# Patient Record
Sex: FEMALE | Race: WHITE | Employment: OTHER | URBAN - METROPOLITAN AREA
[De-identification: names, ages, dates, MRNs, and addresses within clinical notes are randomized per-mention and may not be internally consistent; named-entity substitution may affect disease eponyms.]

---

## 2020-02-17 ENCOUNTER — HOSPITAL ENCOUNTER (OUTPATIENT)
Dept: PHYSICAL THERAPY | Age: 75
Discharge: HOME | End: 2020-02-17
Payer: COMMERCIAL

## 2020-02-17 PROCEDURE — 97162 PT EVAL MOD COMPLEX 30 MIN: CPT

## 2020-02-17 PROCEDURE — 97112 NEUROMUSCULAR REEDUCATION: CPT

## 2020-02-17 NOTE — PROGRESS NOTES
PHYSICAL THERAPY - DAILY TREATMENT NOTE    Patient Name: Arya Torres        Date: 2020  : 1945   Yes Patient  Verified  Visit #:   1   of   4-6  Insurance: Payor: BLUE CROSS / Plan: [facility] [facility] / Product Type: PPO /      In time: 10:18 Out time: 10:51   Total Treatment Time: 33     Medicare/BCBS Time Tracking (below)   Total Timed Codes (min): 8 1:1 Treatment Time:  8     TREATMENT AREA =  Imbalance [R26.89]    SUBJECTIVE  Pain Level (on 0 to 10 scale):  0  / 10   Medication Changes/New allergies or changes in medical history, any new surgeries or procedures?    no  If yes, update Summary List   Subjective Functional Status/Changes:  []  No changes reported     See POC         OBJECTIVE      8 min Neuro Re-education:  [x]  See flow sheet   Rationale:      increase ROM and increase strength to improve the patients ability to safely perform ADLs     Billed With/As:   [x] TE   [] TA   [] Neuro   [] Self Care Patient Education: [x] Review HEP    [x] Progressed/Changed HEP based on:   [x] positioning   [x] body mechanics   [] transfers   [] heat/ice application    [] other:      Other Objective/Functional Measures:    See POC     Post Treatment Pain Level (on 0 to 10) scale:   0  / 10     ASSESSMENT  Assessment/Changes in Function:     See POC     []  See Progress Note/Recertification   Patient will continue to benefit from skilled PT services to modify and progress therapeutic interventions, address functional mobility deficits, address ROM deficits, address strength deficits, analyze and address soft tissue restrictions, analyze and cue movement patterns, analyze and modify body mechanics/ergonomics, assess and modify postural abnormalities and instruct in home and community integration to attain remaining goals.    Progress toward goals / Updated goals:    See newly established goals in POC     PLAN  [x]  Upgrade activities as tolerated yes Continue plan of care   []  Discharge due to : []  Other:      Therapist: Halle Barnes    Date: 2/17/2020 Time: 10:05 AM     Future Appointments   Date Time Provider Adrian Seo   3/4/2020 11:00 AM [facility]   3/9/2020  2:30 PM [facility]   3/16/2020  2:00 PM [facility]   3/23/2020  2:30 PM [facility]

## 2020-02-17 NOTE — PROGRESS NOTES
[facility] PHYSICAL THERAPY  [address] [place] [address] [address],[place], [facility] - Phone: [phone]  Fax: [phone] / [address] [address] PHYSICAL THERAPY SERVICES  Patient Name: Naheed Harman : 1945   Medical   Diagnosis: Imbalance [R26.89] Treatment Diagnosis: Imbalance [R26.89]   Onset Date: 3 years ago     Referral Source: Guadlupe Boxer, NP Start of Care [facility]): 2020   Prior Hospitalization: See medical history Provider #: [id number]   Prior Level of Function: H/O Dizziness with transferring from sit to stand and imbalance with ambulation   Comorbidities: Arthritis, CVD, Depression, and HTN   Medications: Verified on Patient Summary List   The Plan of Care and following information is based on the information from the initial evaluation.   ===========================================================================================  Assessment / key information: Patient is 66 y.o. female who [facility] to [facility], [facility] with diagnosis of Imbalance [R26.89]. Patient reports imbalance and impaired gait began 3-4 years ago with episode of vertigo. Since initial episode of dizziness 3-4 years ago patient denies imbalance or dizziness. Chief complaint is constant tinnitus. Upon objective evaluation, patient demonstrates impaired use of vestibular input, decreased use hip and ankle balance strategies, path deviations anddecreased safety with gait, and impaired static and dynamic standing balance. Patient scored 19/30 on FGA indicating increased risk of falls with ambulation.  Patient can benefit from PT interventions to decrease r/o fall, improve safety with ADLs, & decrease sx with ADLs & to improve overall functional status.  ===========================================================================================  Eval Complexity: History MEDIUM  Complexity : 1-2 comorbidities / personal factors will impact the outcome/ POC ;  Examination  HIGH Complexity : 4+ Standardized tests and measures addressing body structure, function, activity limitation and / or participation in recreation ; Presentation LOW Complexity : Stable, uncomplicated ;  Decision Making Other outcome measures Objective Measurements   MEDIUM; Overall Complexity LOW   Problem List: decrease ROM, decrease strength, impaired gait/ balance, decrease ADL/ functional abilitiies, decrease activity tolerance, decrease flexibility/ joint mobility, decrease transfer abilities and other dizziness affecting functional mobility    Treatment Plan may include any combination of the following: Therapeutic exercise, Therapeutic activities, Neuromuscular re-education, Physical agent/modality, Gait/balance training, Manual therapy, Patient education, Self Care training, Functional mobility training, Home safety training and Stair training  Patient / Family readiness to learn indicated by: asking questions, trying to perform skills and interest  Persons(s) to be included in education: patient (P)  Barriers to Learning/Limitations: None  Measures taken, if barriers to learning:    Patient Goal (s): \"Less ringing in the ears\"   Patient self reported health status: good  Rehabilitation Potential: good   Short Term Goals: To be accomplished in  2  weeks:  1) Patient performing daily HEP and educated in fall prevention techniques.  Long Term Goals: To be accomplished in  6  weeks:  1) Patient to be independent with HEP and instructed in vestibular taper to ensure safety and decreased risk of falls following discharge. 2) Patient to improve score on FGA to 22/30 indicating decreased fall risk with ambulation. 3) Patient will be able to maintain MSR heel to GT for 30 seconds eyes closed to increase safety with showering.        Frequency / Duration:   Patient to be seen  1-2  times per week for 6  weeks:  Patient / Caregiver education and instruction: self care, activity modification and exercises  Therapist Signature: Vin Pierce Date: 2/34/2117   Certification Period: 2/17/2020 to 5/15/2019 Time: 10:20 AM   ===========================================================================================  I certify that the above Physical Therapy Services are being furnished while the patient is under my care. I agree with the treatment plan and certify that this therapy is necessary. Physician Signature:        Date:       Time:     Please sign and return to [facility] at [facility], [facility]. or you may fax the signed copy to [phone]. Thank you.

## 2020-03-04 ENCOUNTER — HOSPITAL ENCOUNTER (OUTPATIENT)
Dept: PHYSICAL THERAPY | Age: 75
Discharge: HOME | End: 2020-03-04
Payer: MEDICARE

## 2020-03-04 PROCEDURE — 97116 GAIT TRAINING THERAPY: CPT

## 2020-03-04 PROCEDURE — 97112 NEUROMUSCULAR REEDUCATION: CPT

## 2020-03-04 NOTE — PROGRESS NOTES
PHYSICAL THERAPY - DAILY TREATMENT NOTE    Patient Name: Lulú Neumann        Date: 3/4/2020  : 1945   yes Patient  Verified  Visit #:   2   of   4-6  Insurance: Payor: BLUE CROSS / Plan: [employer] [facility] / Product Type: PPO /      In time: 11:09 Out time: 11:36   Total Treatment Time: 27     Medicare/[employer] Time Tracking (below)   Total Timed Codes (min):  27 1:1 Treatment Time:  27     TREATMENT AREA =  Imbalance [R26.89]    SUBJECTIVE  Pain Level (on 0 to 10 scale):  0  / 10   Medication Changes/New allergies or changes in medical history, any new surgeries or procedures?    no  If yes, update Summary List   Subjective Functional Status/Changes:  []  No changes reported     Homework been ok, its a little better ringing, comes and goes           OBJECTIVE  Modalities Rationale:   N/A    19 min Neuro Re-education: [x]  See flow sheet   Rationale:   Improve standing proprioception to improve patients ability to balance in the shower and stand at night    8 min Gait Training:  ___ feet with ___ device on level surfaces with ___ level of assistance   Rationale: To improve ambulation safety and efficiency in order to improve patient's ability to safely ambulate at home for self care.     Billed With/As:   [] TE   [] TA   [x] Neuro   [] Self Care Patient Education: [x] Review HEP    [] Progressed/Changed HEP based on:   [] positioning   [x] body mechanics   [x] transfers   [] heat/ice application    [x] other: Gait and static standing balance     Other Objective/Functional Measures:    Cuing for smooth head turns with VSE, education to not pause in the middle of the had turn  Demo decr candence, NUZHAT, and step length with retro walking with VHTs  Incr path dev to the right with amb with EC, requires CGA   Demonstrates leg hooking compensatory motion with SLS     Post Treatment Pain Level (on 0 to 10) scale:   0  / 10     ASSESSMENT  Assessment/Changes in Function:     Good tolerance for progressions in static standing balance as patient performed balance training exercises safely without LOB. Exhibited slight path deviations with gait training and sway/ankle strategy with balance training and need for assist/skilled PT. Required SBA/CGA for static standing balance EC on foam and gait training with EC.      []  See Progress Note/Recertification   Patient will continue to benefit from skilled PT services to modify and progress therapeutic interventions, address functional mobility deficits, address ROM deficits, address strength deficits, analyze and cue movement patterns, analyze and modify body mechanics/ergonomics, assess and modify postural abnormalities, address imbalance/dizziness and instruct in home and community integration to attain remaining goals. Progress toward goals / Updated goals:    First visit after initial evaluation. Progress tx per POC.         PLAN  [x]  Upgrade activities as tolerated yes Continue plan of care   []  Discharge due to :    []  Other:      Therapist: Yanique Boateng    Date: 3/4/2020 Time: 8:09 AM     Future Appointments   Date Time Provider Adrian Seo   3/4/2020 11:00 AM [facility]   3/9/2020  2:30 PM [facility]   3/16/2020  2:00 PM [facility]   3/23/2020  2:30 PM [facility]

## 2020-03-09 ENCOUNTER — HOSPITAL ENCOUNTER (OUTPATIENT)
Dept: PHYSICAL THERAPY | Age: 75
Discharge: HOME | End: 2020-03-09
Payer: MEDICARE

## 2020-03-09 PROCEDURE — 97112 NEUROMUSCULAR REEDUCATION: CPT

## 2020-03-09 PROCEDURE — 97116 GAIT TRAINING THERAPY: CPT

## 2020-03-09 NOTE — PROGRESS NOTES
PHYSICAL THERAPY - DAILY TREATMENT NOTE    Patient Name: Terry Johnson        Date: 3/9/2020  : 1945   yes Patient  Verified  Visit #:   3  of   4-6  Insurance: Payor: [place] MED[employer] / Plan: [place] MEDICARE PART A & B / Product Type: Medicare /      In time: 2:36 Out time: 3:04   Total Treatment Time: 28     Medicare/BCBS Time Tracking (below)   Total Timed Codes (min):  28 1:1 Treatment Time: 28     TREATMENT AREA =  Imbalance [R26.89]    SUBJECTIVE  Pain Level (on 0 to 10 scale):  0  / 10   Medication Changes/New allergies or changes in medical history, any new surgeries or procedures?    no  If yes, update Summary List   Subjective Functional Status/Changes:  []  No changes reported     Ringning is much much better, when it starts ringing I do my exercises and it helps          OBJECTIVE  Modalities Rationale:   N/A    20 min Neuro Re-education: [x]  See flow sheet   Rationale:   Improve standing proprioception to improve patients ability to balance in the shower and stand at night    8 min Gait Training:  _20-30__ feet without assistive device on level surfaces with ___ level of assistance   Rationale: To improve ambulation safety and efficiency in order to improve patient's ability to safely ambulate at home for self care. Billed With/As:   [] TE   [] TA   [x] Neuro   [] Self Care Patient Education: [x] Review HEP    [] Progressed/Changed HEP based on:   [] positioning   [x] body mechanics   [x] transfers   [] heat/ice application    [x] other: Gait and static standing balance     Other Objective/Functional Measures:  Fair tolerance for Nustep warm up secondary to increased R Knee sx. Improved form with VSE exercise  Incr path dev and use of UEs with amb with EC and retro with VHTs     Post Treatment Pain Level (on 0 to 10) scale:   0  / 10     ASSESSMENT  Assessment/Changes in Function:     Continues to demonstrate veering and path deviations with gait training with head turns and EC.  Improving static standing balance EC on foam. Good  Response to treatment and denies ncr sx post session. []  See Progress Note/Recertification   Patient will continue to benefit from skilled PT services to modify and progress therapeutic interventions, address functional mobility deficits, address ROM deficits, address strength deficits, analyze and cue movement patterns, analyze and modify body mechanics/ergonomics, assess and modify postural abnormalities, address imbalance/dizziness and instruct in home and community integration to attain remaining goals. Progress toward goals / Updated goals:    Progressing towards STG 1.   1) Patient performing daily HEP and educated in fall prevention techniques.         PLAN  [x]  Upgrade activities as tolerated yes Continue plan of care   []  Discharge due to :    []  Other:      Therapist: Virginia White    Date: 3/9/2020 Time: 8:09 AM     Future Appointments   Date Time Provider Adrian Seo   3/9/2020  2:30 PM [facility]   3/16/2020  2:00 PM [facility]   3/23/2020  2:30 PM [facility]

## 2020-03-16 ENCOUNTER — APPOINTMENT (OUTPATIENT)
Dept: PHYSICAL THERAPY | Age: 75
End: 2020-03-16
Payer: MEDICARE

## 2022-08-22 ENCOUNTER — HOSPITAL ENCOUNTER (OUTPATIENT)
Dept: PHYSICAL THERAPY | Age: 77
Discharge: HOME | End: 2022-08-22
Payer: MEDICARE

## 2022-08-22 PROCEDURE — 97161 PT EVAL LOW COMPLEX 20 MIN: CPT

## 2022-08-22 PROCEDURE — 97110 THERAPEUTIC EXERCISES: CPT

## 2022-08-22 PROCEDURE — 97535 SELF CARE MNGMENT TRAINING: CPT

## 2022-08-22 NOTE — PROGRESS NOTES
[address]  [address] [place] [address] Drive, [facility] - Phone: [phone]  Fax: [phone] / [phone] [facility]  Patient Name: Sandro Modi : 1945   Medical   Diagnosis: Anterolisthesis of cervical spine  OA (B) knees   Backache  Treatment Diagnosis: Spondylolisthesis, cervical region [M43.12]  Dorsalgia, unspecified [M54.9]  Pain in unspecified knee [M25.569]   Onset Date: ~     Referral Source: Angelina Valdes MD Start of [facility]): 2022   Prior Hospitalization: See medical history Provider #: [id number]   Prior Level of Function: Chronic pain with ambulation, stairs, transfers and ADL's    Comorbidities: Arthritis, Vertigo,    Medications: Verified on Patient Summary List   The Plan of Care and following information is based on the information from the initial evaluation.   ===========================================================================================  Assessment / key information:  Patient is a 64year old female presenting to PT with cc of B knee pain and neck pain (R>L). Pt has long standing history of arthritis in \"a lot of joints\". Pt admits to not wanting to get TKR on either knee and wishes to address conservatively. R knee is more bothersome than the L. Currently wearing compression knee brace on R knee, wishes to get one for L knee. Pt does not have stairs in her home, uses hands to get up from a chair. Pt keeps SPC in the care, however does not wish to use it yet. Denies falls, admits to occasional stumbling. Admits to sharp pain on the R side of neck, will decrease when she puts pressure on it. Most aggravating with laying on R and turning head.  Pain ranges from 5-7/10 at worst, 5/10 at best. Easing factors: icy hot (knees), pain relief ointment on neck (pt cannot recall name)  Pt has had PT previously, which she reports was successful and wishes to start again to reduce pain and strengthen the knees. Objective Data:   FOTO score = 38 (an established functional score where 100 = no disability). Posture: slight FHRS positioning   Gait: decreased meagan, moderate Trendelenburg  Cervical ROM: Flex WNL, Ext 25% limited, Rot L= 50 deg, R= 55 deg  Knee ROM: R knee (in brace) 0-127 deg, L knee 0-138 deg pain   Flexibility: hamstrings and gastroc mildly impaired B  Strength MMT: Knee= Flex: 4/5 R, 4+/5 L. Ext: 4+/5 B. . Hip Abduction= 3/5 B. Quad lag noted B during SLR flexion   STS: use of B UE to stand, increased use of momentum   Palpation: TTP and restriction noted to B quads, hamstrings and calf complex. R UT, LS, Rhomboids with HA reproduction during palpation    HEP: Access Code: [email or web address]  URL: [email or web address]  Date: 06/03/2022 Prepared by: Samantha Simms, PT     Exercises  Upper trap stretch - 1 x daily - 7 x weekly - 1 sets - 3 reps - 20s hold  Levator scapulae stretch - 1 x daily - 7 x weekly - 1 sets - 3 reps - 20s hold  Supine quad set- 1 x daily - 7 x weekly - 2 sets - 10 reps - 3s hold  Seated LAQ- 1 x daily - 7 x weekly - 2 sets - 10 reps   Standing hip abduction - 1 x daily - 7 x weekly - 2 sets - 10 reps     Pt counseled on diagnosis, prognosis, PT findings and POC. Pt educated on HEP with review and demonstration with printed copy, advised pt to discontinue if symptoms worsen.  Pt will benefit from PT to address deficits listed in this document to facilitate improved ADL's and function.   ===========================================================================================  Eval Complexity: History MEDIUM  Complexity : 1-2 comorbidities / personal factors will impact the outcome/ POC ;  Examination  LOW Complexity : 1-2 Standardized tests and measures addressing body structure, function, activity limitation and / or participation in recreation ; Presentation LOW Complexity : Stable, uncomplicated ;  Decision Making MEDIUM Complexity : FOTO score of 26-74; Overall Complexity LOW   Problem List: pain affecting function, decrease ROM, decrease strength, impaired gait/ balance, decrease ADL/ functional abilitiies, decrease activity tolerance, decrease flexibility/ joint mobility, and decrease transfer abilities   Treatment Plan may include any combination of the following: Therapeutic exercise, Therapeutic activities, Neuromuscular re-education, Physical agent/modality, Gait/balance training, Manual therapy, Aquatic therapy, Patient education, Self Care training, Functional mobility training, and Stair training  Patient / Family readiness to learn indicated by: asking questions and interest  Persons(s) to be included in education: patient (P)  Barriers to Learning/Limitations: None  Measures taken, if barriers to learning:    Patient Goal (s): \" Some movement relief\"    Patient self reported health status: good  Rehabilitation Potential: good  Short Term Goals: To be accomplished in  2  weeks:  Patient will be independent with HEP to improve self management of symptoms   Patient will report 4/10 max pain during ADL's   Patient will improve cervical rotation to >/= 60 deg B   Long Term Goals:  To be accomplished in  6  weeks:  Patient will improve FOTO score by >/= 10 points for improved overall function   Patient will demonstrate ability to perform 5x STS from chair without use of UE for improved functional strength   Patient will improve B hip abduction strength to </= 4/5 for improved gait, balance and transfers   Frequency / Duration:   Patient to be seen  2  times per week for 6-8  weeks:  Patient / Caregiver education and instruction: self care, activity modification, and exercises  Therapist Signature: Jana Hernandez PT Date: 0/37/6013   Certification Period: 8/22/22- 11/22/22 Time: 8:09 AM   ===========================================================================================  I certify that the above Physical Therapy Services are being furnished while the patient is under my care. I agree with the treatment plan and certify that this therapy is necessary. Physician Signature:        Date:       Time:                                        Alma Patel MD  Please sign and return to [facility] at [facility], [facility]. or you may fax the signed copy to [phone]. Thank you.

## 2022-08-22 NOTE — PROGRESS NOTES
PHYSICAL THERAPY - DAILY TREATMENT NOTE    Patient Name: Saida Bateman        Date: 2022  : 1945   yes Patient  Verified  Visit #:   1     Insurance: Payor: Sheela Cordero / Plan: [place] MEDICARE PART A & B / Product Type: Medicare /      In time: 9613 Out time: 46   Total Treatment Time: 45     Medicare/BCBS Time Tracking (below)   Total Timed Codes (min):  25 1:1 Treatment Time:  45     TREATMENT AREA =  Spondylolisthesis, cervical region [M43.12]  Dorsalgia, unspecified [M54.9]  Pain in unspecified knee [M25.569]    SUBJECTIVE  Pain Level (on 0 to 10 scale):  0  / 10   Medication Changes/New allergies or changes in medical history, any new surgeries or procedures?    no  If yes, update Summary List   Subjective Functional Status/Changes:  []  No changes reported     See POC          OBJECTIVE    15 min Therapeutic Exercise:  [x]  See flow sheet   Rationale:      increase ROM, increase strength, and improve coordination to improve the patients ability to perform ADL's and functional tasks      10 min Self Care: Pt counseled on PT findings, expectations, prognosis and POC. Pt educated on HEP with review and demonstration. Advised pt to utilize second knee brace on L if needed for symptom relief. Rationale:     Patient education  to improve the patients ability to self manage symptoms.      Billed With/As:   [x] TE   [] TA   [] Neuro   [] Self Care Patient Education: [x] Review HEP    [] Progressed/Changed HEP based on:   [] positioning   [] body mechanics   [] transfers   [] heat/ice application    [] other:      Other Objective/Functional Measures:    See POC   Post Treatment Pain Level (on 0 to 10) scale:   0  / 10     ASSESSMENT  Assessment/Changes in Function:     See POC     []  See Progress Note/Recertification   Patient will continue to benefit from skilled PT services to modify and progress therapeutic interventions, address functional mobility deficits, address ROM deficits, address strength deficits, analyze and address soft tissue restrictions, analyze and cue movement patterns, analyze and modify body mechanics/ergonomics, and assess and modify postural abnormalities to attain remaining goals.    Progress toward goals / Updated goals:    See POC     PLAN  [x]  Upgrade activities as tolerated yes Continue plan of care   []  Discharge due to :    []  Other:      Therapist: Francheska Herron PT    Date: 8/22/2022 Time: 8:12 AM     Future Appointments   Date Time Provider Adrian Seo   8/22/2022 11:00 AM Misa Gresham, [address] [facility]

## 2022-08-25 ENCOUNTER — HOSPITAL ENCOUNTER (OUTPATIENT)
Dept: PHYSICAL THERAPY | Age: 77
Discharge: HOME | End: 2022-08-25
Payer: MEDICARE

## 2022-08-25 PROCEDURE — 97140 MANUAL THERAPY 1/> REGIONS: CPT

## 2022-08-25 PROCEDURE — 97530 THERAPEUTIC ACTIVITIES: CPT

## 2022-08-25 PROCEDURE — 97110 THERAPEUTIC EXERCISES: CPT

## 2022-08-25 NOTE — PROGRESS NOTES
PHYSICAL THERAPY - DAILY TREATMENT NOTE    Patient Name: Salvador To        Date: 2022  : 1945   yes Patient  Verified  Visit #:   2   of     Insurance: Payor: Mishel Roland / Plan: [place] / Product Type: Medicare /      In time: 215 Out time: 305   Total Treatment Time: 50     Medicare/BCBS Time Tracking (below)   Total Timed Codes (min):  40 1:1 Treatment Time:  50     TREATMENT AREA =  Spondylolisthesis, cervical region [M43.12]  Dorsalgia, unspecified [M54.9]  Pain in unspecified knee [M25.569]    SUBJECTIVE  Pain Level (on 0 to 10 scale):  8  / 10 (knees) 7/10 neck   Medication Changes/New allergies or changes in medical history, any new surgeries or procedures?    no  If yes, update Summary List   Subjective Functional Status/Changes:  []  No changes reported     Pt states she has been complaint with HEP most days, can tell it is helping.            OBJECTIVE  Modalities Rationale:     decrease inflammation and decrease pain to improve patient's ability to tolerate ADL's and standing tasks    min [] Estim, type/location:                                      []  att     []  unatt     []  w/US     []  w/ice    []  w/heat    min []  Mechanical Traction: type/lbs                   []  pro   []  sup   []  int   []  cont    []  before manual    []  after manual    min []  Ultrasound, settings/location:      min []  Iontophoresis w/ dexamethasone, location:                                               []  take home patch       []  in clinic   10 min [x]  Ice     []  Heat    location/position: B knees in long sit position, knees propped     min []  Vasopneumatic Device, press/temp:    If using vaso (only need to measure limb vaso being performed on)      pre-treatment girth :       post-treatment girth :       measured at (landmark location) :      min []  Other:    [x] Skin assessment post-treatment (if applicable):    [x]  intact    []  redness- no adverse reaction []redness - adverse reaction:      17 min Therapeutic Exercise:  [x]  See flow sheet   Rationale:      increase ROM and increase strength to improve the patients ability to perform ADL's and standing tasks      15 min Manual Therapy: STM to B quads, hamstrings, UT, LS, cervical perispinal's   B patella mobilizations   Rationale:      decrease pain, increase ROM, and increase tissue extensibility to improve patient's ability to perform ADL's and functional tasks   The manual therapy interventions were performed at a separate and distinct time from the therapeutic activities interventions. 8 min Therapeutic Activity: [x]  See flow sheet   Rationale:    increase strength and improve coordinationto improve the patients ability to perform functional tasks and transfers     Billed With/As:   [x] TE   [] TA   [] Neuro   [] Self Care Patient Education: [x] Review HEP    [] Progressed/Changed HEP based on:   [] positioning   [] body mechanics   [] transfers   [] heat/ice application    [] other:      Other Objective/Functional Measures:    Initiated and progress therapeutic intervention today per flow sheet, tolerated well without adverse reactions     TTP to R UT and LS, reduced with manuals     Requires verbal and tactile cueing for STS without use of UE and lateral stepping in parallel bars to avoid ER. Post Treatment Pain Level (on 0 to 10) scale:   1  / 10 (L knee), 4/10 (R knee), 0/10 neck      ASSESSMENT  Assessment/Changes in Function:     Pt faired well during session today and thus far responding favorably to therapeutic intervention with decreased reports of pain at end of session. Pt most challenged with STS transfers today, utilized hi-low table for manageable height to perform without use of B UE. Will continue to progress as able.       []  See Progress Note/Recertification   Patient will continue to benefit from skilled PT services to modify and progress therapeutic interventions, address functional mobility deficits, address ROM deficits, address strength deficits, analyze and address soft tissue restrictions, analyze and cue movement patterns, analyze and modify body mechanics/ergonomics, and assess and modify postural abnormalities to attain remaining goals. Progress toward goals / Updated goals:    Initiated, first follow up session. All goals remain appropriate.       PLAN  [x]  Upgrade activities as tolerated yes Continue plan of care   []  Discharge due to :    []  Other:      Therapist: Santosh Cabrera PT    Date: 8/25/2022 Time: 1:45 PM     Future Appointments   Date Time Provider Adrian Seo   8/25/2022  2:00 PM Salvador Jimenes, [facility]   8/30/2022  2:45 PM Salvador Jimenes, [address] [facility]

## 2022-08-30 ENCOUNTER — HOSPITAL ENCOUNTER (OUTPATIENT)
Dept: PHYSICAL THERAPY | Age: 77
Discharge: HOME | End: 2022-08-30
Payer: MEDICARE

## 2022-08-30 PROCEDURE — 97140 MANUAL THERAPY 1/> REGIONS: CPT

## 2022-08-30 PROCEDURE — 97110 THERAPEUTIC EXERCISES: CPT

## 2022-08-30 NOTE — PROGRESS NOTES
PHYSICAL THERAPY - DAILY TREATMENT NOTE    Patient Name: Lauro Pickering        Date: 2022  : 1945   yes Patient  Verified  Visit #:   3   of   18  Insurance: Payor: Eva Roth / Plan: [place] MEDICARE PART A & B / Product Type: Medicare /      In time: 245 Out time: 335   Total Treatment Time: 50     Medicare/BCBS Time Tracking (below)   Total Timed Codes (min):  40 1:1 Treatment Time:  50     TREATMENT AREA =  Spondylolisthesis, cervical region [M43.12]  Dorsalgia, unspecified [M54.9]  Pain in unspecified knee [M25.569]    SUBJECTIVE  Pain Level (on 0 to 10 scale):  5 / 10 (R knee), 1/10 (L knee)   Medication Changes/New allergies or changes in medical history, any new surgeries or procedures?    no  If yes, update Summary List   Subjective Functional Status/Changes:  []  No changes reported     Pt states she doing well, can tell a huge improvement in her knees. States she has been doing her HEP without issues.           OBJECTIVE  Modalities Rationale:     decrease inflammation and decrease pain to improve patient's ability to perform standing and functional tasks    min [] Estim, type/location:                                      []  att     []  unatt     []  w/US     []  w/ice    []  w/heat    min []  Mechanical Traction: type/lbs                   []  pro   []  sup   []  int   []  cont    []  before manual    []  after manual    min []  Ultrasound, settings/location:      min []  Iontophoresis w/ dexamethasone, location:                                               []  take home patch       []  in clinic   10 min [x]  Ice     []  Heat    location/position: B knees in long sit position, knees propped     min []  Vasopneumatic Device, press/temp:    If using vaso (only need to measure limb vaso being performed on)      pre-treatment girth :       post-treatment girth :       measured at (landmark location) :      min []  Other:    [x] Skin assessment post-treatment (if applicable):    [x]  intact []  redness- no adverse reaction                  []redness - adverse reaction:      25 min Therapeutic Exercise:  [x]  See flow sheet   Rationale:      increase ROM and increase strength to improve the patients ability to perform standing tasks and ADL's      15 min Manual Therapy: STM to B quads, hamstrings, UT, LS, cervical perispinal's   B patella mobilizations   Rationale:      decrease pain, increase ROM, and increase tissue extensibility to improve patient's ability to tolerate ADL's and functional tasks   The manual therapy interventions were performed at a separate and distinct time from the therapeutic activities interventions. Billed With/As:   [x] TE   [] TA   [] Neuro   [] Self Care Patient Education: [x] Review HEP    [] Progressed/Changed HEP based on:   [] positioning   [] body mechanics   [] transfers   [] heat/ice application    [] other:      Other Objective/Functional Measures:    Decreased TTP to cervical musculature and knees today with manual intervention     Pt challenged with addition of 6\" step ups today, UE usage for stability and power up     Continued with exercises per flowsheet     Post Treatment Pain Level (on 0 to 10) scale:   2.5  / 10 (R knee), 0/10 L knee     ASSESSMENT  Assessment/Changes in Function:     Patient faired well during session today. Continues to respond favorably to manual and therapeutic intervention with decreased reports of pain levels after treatment. Patient counseled on benefits of continued therapy to improve ROM, strength and overall function. Will progress as able.       []  See Progress Note/Recertification   Patient will continue to benefit from skilled PT services to modify and progress therapeutic interventions, address functional mobility deficits, address ROM deficits, address strength deficits, analyze and address soft tissue restrictions, analyze and cue movement patterns, and analyze and modify body mechanics/ergonomics to attain remaining goals.   Progress toward goals / Updated goals:    Short Term Goals: To be accomplished in  2  weeks:  Patient will be independent with HEP to improve self management of symptoms - Progressing, pt reports compliance with initial HEP  Patient will report 4/10 max pain during ADL's   Patient will improve cervical rotation to >/= 60 deg B   Long Term Goals:  To be accomplished in  6  weeks:  Patient will improve FOTO score by >/= 10 points for improved overall function   Patient will demonstrate ability to perform 5x STS from chair without use of UE for improved functional strength   Patient will improve B hip abduction strength to </= 4/5 for improved gait, balance and transfers     PLAN  [x]  Upgrade activities as tolerated yes Continue plan of care   []  Discharge due to :    []  Other:      Therapist: Kath De Leon PT    Date: 8/30/2022 Time: 2:32 PM     Future Appointments   Date Time Provider Adrian Seo   8/30/2022  2:45 PM Penny Saucedo, [facility]   9/1/2022  2:00 PM Penny Saucedo [facility]   9/8/2022 12:00 PM Penny Saucedo [facility]   9/13/2022  2:00 PM Penny Saucedo [facility]

## 2022-09-01 ENCOUNTER — HOSPITAL ENCOUNTER (OUTPATIENT)
Dept: PHYSICAL THERAPY | Age: 77
Discharge: HOME | End: 2022-09-01
Payer: MEDICARE

## 2022-09-01 PROCEDURE — 97110 THERAPEUTIC EXERCISES: CPT

## 2022-09-01 PROCEDURE — 97140 MANUAL THERAPY 1/> REGIONS: CPT

## 2022-09-01 NOTE — PROGRESS NOTES
PHYSICAL THERAPY - DAILY TREATMENT NOTE    Patient Name: Cecily Shelby        Date: 2022  : 1945   yes Patient  Verified  Visit #:      18  Insurance: Payor: Manny Ruth / Plan: [place] MEDICARE PART A & B / Product Type: Medicare /      In time: 2:11 Out time: 2:58   Total Treatment Time: 47     Medicare/BCBS Time Tracking (below)   Total Timed Codes (min):  47 1:1 Treatment Time:  47     TREATMENT AREA =  Spondylolisthesis, cervical region [M43.12]  Dorsalgia, unspecified [M54.9]  Pain in unspecified knee [M25.569]    SUBJECTIVE  Pain Level (on 0 to 10 scale):  5 / 10 (R knee), 1/10 (L knee)   Medication Changes/New allergies or changes in medical history, any new surgeries or procedures?    no  If yes, update Summary List   Subjective Functional Status/Changes:  []  No changes reported     Patient reports falling the other day. \"I fell in my den, slowly, on to my hands and knees. I didn't hurt myself. I was able to get back up. \"         OBJECTIVE  Modalities Rationale:      N/A  to improve patient's ability to perform standing and functional tasks    min [] Estim, type/location:                                      []  att     []  unatt     []  w/US     []  w/ice    []  w/heat    min []  Mechanical Traction: type/lbs                   []  pro   []  sup   []  int   []  cont    []  before manual    []  after manual    min []  Ultrasound, settings/location:      min []  Iontophoresis w/ dexamethasone, location:                                               []  take home patch       []  in clinic   PD min [x]  Ice     []  Heat    location/position: B knees in long sit position, knees propped     min []  Vasopneumatic Device, press/temp:    If using vaso (only need to measure limb vaso being performed on)      pre-treatment girth :       post-treatment girth :       measured at (landmark location) :      min []  Other:    [x] Skin assessment post-treatment (if applicable):    [x]  intact    [] redness- no adverse reaction                  []redness - adverse reaction:      32 min Therapeutic Exercise:  [x]  See flow sheet   Rationale:      increase ROM and increase strength to improve the patients ability to perform standing tasks and ADL's      15 min Manual Therapy: STM to B quads, hamstrings,   B patella mobilizations; B manual hamstring stretch   Rationale:      decrease pain, increase ROM, and increase tissue extensibility to improve patient's ability to tolerate ADL's and functional tasks   The manual therapy interventions were performed at a separate and distinct time from the therapeutic activities interventions. Billed With/As:   [x] TE   [] TA   [] Neuro   [] Self Care Patient Education: [x] Review HEP    [] Progressed/Changed HEP based on:   [] positioning   [] body mechanics   [] transfers   [] heat/ice application    [] other:      Other Objective/Functional Measures:    *manual observation: increase tenderness and tightness along R knee medial joint line  *gradually progressed exercises by increasing repetitions (see flowsheet for loads, drills, & volumes)  *added incline board to increase LE flexibility       Post Treatment Pain Level (on 0 to 10) scale:   3  / 10 (R knee), 0/10 L knee     ASSESSMENT  Assessment/Changes in Function:     Patient demo good tolerance overall with PT interventions indcaited by reduction in pain and improved activity tolerance. Discussed fall prevention and fall recovery techniques to reduce knee exacerbation of symptoms. Discussed possibility of performing fall recovery techniques in clinic. Pt agreeable with this.      []  See Progress Note/Recertification   Patient will continue to benefit from skilled PT services to modify and progress therapeutic interventions, address functional mobility deficits, address ROM deficits, address strength deficits, analyze and address soft tissue restrictions, analyze and cue movement patterns, and analyze and modify body mechanics/ergonomics to attain remaining goals. Progress toward goals / Updated goals:    Short Term Goals: To be accomplished in  2  weeks:  Patient will be independent with HEP to improve self management of symptoms - Progressing, pt reports compliance with initial HEP  Patient will report 4/10 max pain during ADL's   Patient will improve cervical rotation to >/= 60 deg B   Long Term Goals:  To be accomplished in  6  weeks:  Patient will improve FOTO score by >/= 10 points for improved overall function   Patient will demonstrate ability to perform 5x STS from chair without use of UE for improved functional strength   Patient will improve B hip abduction strength to </= 4/5 for improved gait, balance and transfers       PLAN  [x]  Upgrade activities as tolerated yes Continue plan of care   []  Discharge due to :    []  Other:      Therapist: Anupam Lindsay    Date: 9/1/2022 Time: 6:57 PM     Future Appointments   Date Time Provider Adrian Seo   9/8/2022 12:00 PM Misa Gresham [facility]   9/13/2022  2:00 PM Misa Gresham [facility]   9/19/2022  8:30 AM Misa Gresham [facility]   9/26/2022  8:30 AM Misa rGesham PT [facility]   10/3/2022  9:40 AM Misa Gresham PT [facility]

## 2022-09-08 ENCOUNTER — APPOINTMENT (OUTPATIENT)
Dept: PHYSICAL THERAPY | Age: 77
End: 2022-09-08
Payer: MEDICARE

## 2022-09-13 ENCOUNTER — HOSPITAL ENCOUNTER (OUTPATIENT)
Dept: PHYSICAL THERAPY | Age: 77
Discharge: HOME | End: 2022-09-13
Payer: MEDICARE

## 2022-09-13 PROCEDURE — 97140 MANUAL THERAPY 1/> REGIONS: CPT

## 2022-09-13 PROCEDURE — 97530 THERAPEUTIC ACTIVITIES: CPT

## 2022-09-13 PROCEDURE — 97110 THERAPEUTIC EXERCISES: CPT

## 2022-09-13 NOTE — PROGRESS NOTES
PHYSICAL THERAPY - DAILY TREATMENT NOTE    Patient Name: Ro Nogueira        Date: 2022  : 1945   yes Patient  Verified  Visit #:      of   18  Insurance: Payor: Murphy Daniels / Plan: [place] MEDICARE PART A & B / Product Type: Medicare /      In time: 200 Out time: 255   Total Treatment Time: 55     Medicare/BCBS Time Tracking (below)   Total Timed Codes (min):  45 1:1 Treatment Time:  55     TREATMENT AREA =  Spondylolisthesis, cervical region [M43.12]  Dorsalgia, unspecified [M54.9]  Pain in unspecified knee [M25.569]    SUBJECTIVE  Pain Level (on 0 to 10 scale):  4  / 10 (both knees), 0/10 in neck with rest   Medication Changes/New allergies or changes in medical history, any new surgeries or procedures?    no  If yes, update Summary List   Subjective Functional Status/Changes:  []  No changes reported     Pt reports she is doing much better. Both knees are feeling better with less pain. Neck has been feeling better and only has caught once. States she does not need ice as much.          OBJECTIVE  Modalities Rationale:     decrease inflammation and decrease pain to improve patient's ability to perform ADL's and functional tasks    min [] Estim, type/location:                                      []  att     []  unatt     []  w/US     []  w/ice    []  w/heat    min []  Mechanical Traction: type/lbs                   []  pro   []  sup   []  int   []  cont    []  before manual    []  after manual    min []  Ultrasound, settings/location:      min []  Iontophoresis w/ dexamethasone, location:                                               []  take home patch       []  in clinic   10 min [x]  Ice     []  Heat    location/position: B knees in long sit position, knees propped     min []  Vasopneumatic Device, press/temp:    If using vaso (only need to measure limb vaso being performed on)      pre-treatment girth :       post-treatment girth :       measured at (landmark location) :      min []  Other: [x] Skin assessment post-treatment (if applicable):    [x]  intact    []  redness- no adverse reaction                  []redness - adverse reaction:      15 min Therapeutic Exercise:  [x]  See flow sheet   Rationale:      increase ROM and increase strength to improve the patients ability to perform ADL's and functional tasks      10 min Manual Therapy: STM to B quads, hamstrings,   B patella mobilizations; B manual hamstring stretch   Rationale:      decrease pain, increase ROM, and increase tissue extensibility to improve patient's ability to tolerate ADL's and functional tasks   The manual therapy interventions were performed at a separate and distinct time from the therapeutic activities interventions. 15 min Therapeutic Activity: [x]  See flow sheet   Rationale:    increase ROM, increase strength, and improve coordination to improve the patients ability to perform functional tasks and ease with transfers     Billed With/As:   [x] TE   [] TA   [] Neuro   [] Self Care Patient Education: [x] Review HEP    [] Progressed/Changed HEP based on:   [] positioning   [] body mechanics   [] transfers   [] heat/ice application    [] other:      Other Objective/Functional Measures:    Decreased tenderness noted throughout B knee joints today     Required VC and TC to avoid quad lag during SLR on L     Continued with therapeutic exercises per flow sheet, added supine bridges for posterior hip strength and stability      Post Treatment Pain Level (on 0 to 10) scale:   1  / 10 (B knees)     ASSESSMENT  Assessment/Changes in Function:     Pt faired well during session today with decreased reports of pain at end of treatment. Tolerated additional LE strengthening and progress resistance without adverse reactions.       []  See Progress Note/Recertification   Patient will continue to benefit from skilled PT services to modify and progress therapeutic interventions, address functional mobility deficits, address ROM deficits, address strength deficits, analyze and address soft tissue restrictions, analyze and cue movement patterns, analyze and modify body mechanics/ergonomics, assess and modify postural abnormalities, and instruct in home and community integration to attain remaining goals. Progress toward goals / Updated goals:    Short Term Goals: To be accomplished in  2  weeks:  Patient will be independent with HEP to improve self management of symptoms - Progressing, pt reports compliance with initial HEP  Patient will report 4/10 max pain during ADL's - Progressing / MET, pt reports max pain of 4/10. (9/13/22)  Patient will improve cervical rotation to >/= 60 deg B   Long Term Goals:  To be accomplished in  6  weeks:  Patient will improve FOTO score by >/= 10 points for improved overall function   Patient will demonstrate ability to perform 5x STS from chair without use of UE for improved functional strength   Patient will improve B hip abduction strength to </= 4/5 for improved gait, balance and transfers     PLAN  [x]  Upgrade activities as tolerated yes Continue plan of care   []  Discharge due to :    []  Other:      Therapist: Fredi Shabazz PT    Date: 9/13/2022 Time: 2:04 PM     Future Appointments   Date Time Provider Adrian Seo   9/19/2022  8:30 AM Jennifer Matthew, [place] [facility]   9/26/2022  8:30 AM Jennifer Matthew PT [facility]   10/3/2022  9:40 AM Jennifer Matthew, [address] [facility]

## 2022-09-19 ENCOUNTER — HOSPITAL ENCOUNTER (OUTPATIENT)
Dept: PHYSICAL THERAPY | Age: 77
Discharge: HOME | End: 2022-09-19
Payer: MEDICARE

## 2022-09-19 PROCEDURE — 97530 THERAPEUTIC ACTIVITIES: CPT

## 2022-09-19 PROCEDURE — 97110 THERAPEUTIC EXERCISES: CPT

## 2022-09-19 NOTE — PROGRESS NOTES
PHYSICAL THERAPY - DAILY TREATMENT NOTE    Patient Name: Cam Reveal        Date: 2022  : 1945   yes Patient  Verified  Visit #:      18  Insurance: Payor: Tom Yates / Plan: [place] MEDICARE PART A & B / Product Type: Medicare /      In time: 840 Out time: 918   Total Treatment Time: 38     Medicare/BCBS Time Tracking (below)   Total Timed Codes (min):  38 1:1 Treatment Time: 38     TREATMENT AREA =  Spondylolisthesis, cervical region [M43.12]  Dorsalgia, unspecified [M54.9]  Pain in unspecified knee [M25.569]    SUBJECTIVE  Pain Level (on 0 to 10 scale):  0  / 10 (L knee), 3/10 R knee   Medication Changes/New allergies or changes in medical history, any new surgeries or procedures?    no  If yes, update Summary List   Subjective Functional Status/Changes:  []  No changes reported     Pt reports knees are getting better and better everyday. This is the most motion she says she has ever had. PT is working for me. OBJECTIVE    13 min Therapeutic Exercise:  [x]  See flow sheet   Rationale:      increase ROM, increase strength, and improve coordination to improve the patients ability to perform ADL's and standing tasks      25 min Therapeutic Activity: [x]  See flow sheet   Rationale:    increase ROM, increase strength, improve coordination, and improve balance to improve the patients ability to perform functional tasks and ease with transfers     Billed With/As:   [] TE   [] TA   [] Neuro   [] Self Care Patient Education: [x] Review HEP    [] Progressed/Changed HEP based on:   [] positioning   [] body mechanics   [] transfers   [] heat/ice application    [] other:      Other Objective/Functional Measures: Added NuStep today for 5' and 4\" step ups without adverse reactions. Required B UE support for step ups.      Increased weight shift to L LE during sit to stands    Continued with therapeutic exercises per flow sheet, added supine bridges for posterior hip strength and stability Post Treatment Pain Level (on 0 to 10) scale:   0  / 10 ( B knees)     ASSESSMENT  Assessment/Changes in Function:     Pt faired well during session today, demos decent carry over between sessions and ability to tolerate ADL's and functional tasks with less pain. Pt tolerated step ups today, requires use of B UE to perform. Pt will continue to benefit from skilled PT with gradual increase to standing exercises. []  See Progress Note/Recertification   Patient will continue to benefit from skilled PT services to modify and progress therapeutic interventions, address functional mobility deficits, address ROM deficits, address strength deficits, analyze and address soft tissue restrictions, analyze and cue movement patterns, analyze and modify body mechanics/ergonomics, assess and modify postural abnormalities, and instruct in home and community integration to attain remaining goals. Progress toward goals / Updated goals:    Short Term Goals: To be accomplished in  2  weeks:  Patient will be independent with HEP to improve self management of symptoms - Progressing, pt reports compliance with initial HEP  Patient will report 4/10 max pain during ADL's - Progressing / MET, pt reports max pain of 4/10. (9/13/22)  Patient will improve cervical rotation to >/= 60 deg B   Long Term Goals:  To be accomplished in  6  weeks:  Patient will improve FOTO score by >/= 10 points for improved overall function   Patient will demonstrate ability to perform 5x STS from chair without use of UE for improved functional strength   Patient will improve B hip abduction strength to >= 4/5 for improved gait, balance and transfers     PLAN  [x]  Upgrade activities as tolerated yes Continue plan of care   []  Discharge due to :    []  Other:      Therapist: Chasidy Licea PT    Date: 9/19/2022 Time: 8:40 AM     Future Appointments   Date Time Provider Adrian Seo   9/26/2022  8:30 AM Nu Veronica, [address] [facility]   10/3/2022 9:40 AM Amanda Rascon, [address] [facility]

## 2022-09-26 ENCOUNTER — HOSPITAL ENCOUNTER (OUTPATIENT)
Dept: PHYSICAL THERAPY | Age: 77
Discharge: HOME | End: 2022-09-26
Payer: MEDICARE

## 2022-09-26 PROCEDURE — 97535 SELF CARE MNGMENT TRAINING: CPT

## 2022-09-26 PROCEDURE — 97110 THERAPEUTIC EXERCISES: CPT

## 2022-09-26 PROCEDURE — 97530 THERAPEUTIC ACTIVITIES: CPT

## 2022-09-26 NOTE — THERAPY RECERTIFICATION
[address],[place], [address] [facility] - Phone: [phone]  Fax: [phone]  [address] [place] [address] PHYSICAL THERAPY          Patient Name: Tushar Munguia : 1945   Treatment/Medical Diagnosis: Spondylolisthesis, cervical region [M43.12]  Dorsalgia, unspecified [M54.9]  Pain in unspecified knee [M25.569]   Onset Date: ~    Referral Source: Rupal Nunes MD [facility]): 22   Prior Hospitalization: See Medical History Provider #: [id number]   Prior Level of Function: Chronic pain with ambulation, stairs, transfers and ADL's    Comorbidities: Arthritis, Vertigo   Medications: Verified on Patient Summary List   Visits from [facility]: 7 Missed Visits: 1     Goal/Measure of Progress Goal Met? 1. Patient will be independent with HEP to improve self management of symptoms    Status at last Eval: Initiated  Current Status: Reports compliance and [place]  yes, progressing   2. Patient will report 4/10 max pain during ADL's    Status at last Eval: Pain ranges from 5-7/10 Current Status: Max pain 3/10 however reports usually 0-1/10. yes   3. Patient will improve cervical rotation to >/= 60 deg B    Status at last Eval: L rotation= 50 deg, R rotation = 55 deg. Painful and tight Current Status: L rotation = 68 deg, R rotation= 72 deg yes     4. Patient will demonstrate ability to perform 5x STS from chair without use of UE for improved functional strength    Status at last Eval: Requires use of B UE to standing with increased use of momentum required for power up  Current Status: Achieves from standard height chair with increased use of momentum and 10% PT support. Demos ability to perform independently from higher surface. no, progressing     Key Functional Changes/Progress: Pt has made decent progress in B knee and cervical pain / function since beginning PT.  Pt has reduced pain levels from 5-7/10 to 0-3/10 and has not noticed any pain in her neck in 2 weeks. Pt reports ease with walking, increase in stability / balance, improved strength and less pain. Pt self reports ~90% improvement since staring PT. Pt remains challenged with transitioning from sit to stand position. Current Objective Findings:   Cervical ROM: Flexion: WNL, Ext:  WNL, Rot L= 68 deg, R= 72 deg  Knee ROM: 0-138 deg, denies pain   Strength MMT: Knee= Flex: 5-/5 R, 5-/5 L. Ext: 5-/5 B. . Hip Abduction= 4-/5 L, 4/5 R  Functional Strength: STS: achieves from standard height chair with increased use of momentum and 10% PT support , No quad lag noted during SLR    Problem List: pain affecting function, decrease ROM, decrease strength, impaired gait/ balance, decrease ADL/ functional abilitiies, decrease activity tolerance, decrease flexibility/ joint mobility, and decrease transfer abilities   Treatment Plan may include any combination of the following: Therapeutic exercise, Therapeutic activities, Neuromuscular re-education, Physical agent/modality, Gait/balance training, Manual therapy, Patient education, Self Care training, Functional mobility training, Home safety training, and Stair training  Patient Goal(s) has been updated and includes:     Goals for this certification period include and are to be achieved in   4  weeks:  Patient will improve B hip abduction strength to >/= 4+/5 for improved gait, balance and transfers   Patient will demonstrate ability to perform 5x STS from standard height chair without UE support  Patient will improve   3. Frequency / Duration:   Patient to be seen   1   times per week for   4    weeks:    Assessments/Recommendations: PT recommeding to continue with current POC for 1x/week for 4 weeks to improve B LE strength, mobility, and functional transfers for ease with ADL's and functional tasks. If you have any questions/comments please contact us directly at [phone].    Thank you for allowing us to assist in the care of your patient. Therapist Signature: Darya Ball PT Date: 2/83/7455   Certification Period:  Reporting Period: 8/22/22-11/22/22 8/22/22-9/26/22 Time: 8:19 AM   NOTE TO PHYSICIAN:  PLEASE COMPLETE THE ORDERS BELOW AND FAX TO   [facility]: [phone]  If you are unable to process this request in 24 hours please contact our office: [phone]    ___ I have read the above report and request that my patient continue as recommended.   ___ I have read the above report and request that my patient continue therapy with the following changes/special instructions: ________________________________________________   ___ I have read the above report and request that my patient be discharged from therapy.      Physician Signature:        Date:       Time:                                       Angelina Valdes MD

## 2022-09-26 NOTE — PROGRESS NOTES
PHYSICAL THERAPY - DAILY TREATMENT NOTE    Patient Name: Monico Lamas        Date: 2022  : 1945   yes Patient  Verified  Visit #:     Insurance: Payor: Bryce Banegas / Plan: [place] MEDICARE PART A & B / Product Type: Medicare /      In time: 284 Out time: 920   Total Treatment Time: 38     Medicare/BCBS Time Tracking (below)   Total Timed Codes (min):  38 1:1 Treatment Time:  38     TREATMENT AREA =  Spondylolisthesis, cervical region [M43.12]  Dorsalgia, unspecified [M54.9]  Pain in unspecified knee [M25.569]    SUBJECTIVE  Pain Level (on 0 to 10 scale):  2  / 10 (R) 1/10 (L)   Medication Changes/New allergies or changes in medical history, any new surgeries or procedures?    no  If yes, update Summary List   Subjective Functional Status/Changes:  []  No changes reported     Pt reports ~90% improvement since beginning PT.           OBJECTIVE    13 min Therapeutic Exercise:  [x]  See flow sheet   Rationale:      increase ROM and increase strength to improve the patients ability to perform ADL's and functional tasks        15 min Therapeutic Activity: [x]  See flow sheet   Rationale:    increase ROM, increase strength, improve coordination, and improve balance to improve the patients ability to perform functional transfers with ease    10 min Self Care: Pt counseled on updated PT findings, POC and progress thus far. PT and pt discussed d/c from PT in 4 weeks, pt understandable and agreeable.     Rationale:     patient education  to improve the patients ability to self manage symptoms     Billed With/As:   [] TE   [] TA   [] Neuro   [x] Self Care Patient Education: [x] Review HEP    [] Progressed/Changed HEP based on:   [] positioning   [] body mechanics   [] transfers   [] heat/ice application    [] other:      Other Objective/Functional Measures:    See PN     Post Treatment Pain Level (on 0 to 10) scale:   0  / 10     ASSESSMENT  Assessment/Changes in Function:     See PN     []  See Progress Note/Recertification   Patient will continue to benefit from skilled PT services to modify and progress therapeutic interventions, address functional mobility deficits, address ROM deficits, address strength deficits, analyze and address soft tissue restrictions, analyze and cue movement patterns, analyze and modify body mechanics/ergonomics, address imbalance/dizziness, and instruct in home and community integration to attain remaining goals.    Progress toward goals / Updated goals:    See PN     PLAN  [x]  Upgrade activities as tolerated yes Continue plan of care   []  Discharge due to :    []  Other:      Therapist: Lucho Francisco PT    Date: 9/26/2022 Time: 8:27 AM     Future Appointments   Date Time Provider Adrian Seo   9/26/2022  8:30 AM Chris Elaine, PT [facility]   10/3/2022  9:40 AM Chris Elaine, [address] [facility]

## 2022-10-12 ENCOUNTER — HOSPITAL ENCOUNTER (OUTPATIENT)
Dept: PHYSICAL THERAPY | Age: 77
Discharge: HOME | End: 2022-10-12
Payer: MEDICARE

## 2022-10-12 PROCEDURE — 97110 THERAPEUTIC EXERCISES: CPT

## 2022-10-12 PROCEDURE — 97112 NEUROMUSCULAR REEDUCATION: CPT

## 2022-10-12 NOTE — PROGRESS NOTES
PHYSICAL THERAPY - DAILY TREATMENT NOTE    Patient Name: Noemí Serrato        Date: 10/12/2022  : 1945   yes Patient  Verified  Visit #:      of   18  Insurance: Payor: Griffin Romero / Plan: [place] MEDICARE PART A & B / Product Type: Medicare /      In time: 570 Out time: 6081   Total Treatment Time: 47     Medicare/BCBS Time Tracking (below)   Total Timed Codes (min):  47 1:1 Treatment Time:  47     TREATMENT AREA =  Spondylolisthesis, cervical region [M43.12]  Other dorsalgia [M54.89]  Left knee pain [M25.562]  Right knee pain [M25.561]    SUBJECTIVE  Pain Level (on 0 to 10 scale):  2  / 10 (R) 2/10 (L)   Medication Changes/New allergies or changes in medical history, any new surgeries or procedures?    no  If yes, update Summary List   Subjective Functional Status/Changes:  []  No changes reported     Patient reports having a little bit of clicking in the knees but feels much better in the neck. States she went for a walk around her neighborhood and felt more fatigue than pain (in the knees). Denies any falls or red flags since LV. OBJECTIVE    20 min Therapeutic Exercise:  [x]  See flow sheet   Rationale:      increase ROM and increase strength to improve the patients ability to perform ADL's and functional tasks        27 min Neuromuscular Re-Ed: [x]  See flow sheet   Rationale:    increase ROM, increase strength, improve coordination, and improve balance to improve the patients ability to perform functional transfers with ease    Billed With/As:   [x] TE   [] TA   [] Neuro   [] Self Care Patient Education: [x] Review HEP:   [facility] Access Code Date [facility]   [email or web address] 10/12/22     [x] Progressed/Changed HEP based on:   [] Addressed barriers and behaviors     [] Therapeutic Neuroscience Pain Education, metaphor, reframing, contexts.     [] Sleep Education   [] Body Mechanics [] Healing Timeframe     [] Self STM with ball at \"the spot\"  [] Walking Program/Global Activity   [] other: Other Objective/Functional Measures:    Access Code: [email or web address]  URL: [email or web address]  Date: 10/12/2022  Prepared by: Salina Tellez    Program Notes  PERFORM ALL EXERCISES TO YOUR TOLERANCE. IF SHARP PAIN OR RED FLAGS OCCUR, IMMEDIATELY STOP EXERCISE. Exercises  Gastroc Stretch on Wall - 2-3 x daily - 7 x weekly - 3 reps - 20-30 hold  Sit to Stand - 2 x daily - 4-5 x weekly - 2 sets - 10 reps  Side Stepping with Counter Support - 2 x daily - 4-5 x weekly - 5 reps  USE A FLUFFY PILLOW BEHIND THE KNEE - Standing Terminal Knee Extension at Wall with Ball - 2 x daily - 4-5 x weekly - 10 reps - 5 hold  Standing Single Leg Stance with Unilateral Counter Support - 2 x daily - 4-5 x weekly - 3 reps - 15-20 hold    *initiated PT session with subjective taken followed by warm up on NuStep then exercises in parallel bars and table exercises   *progressed current exercises to improve knee strength/stability (see flowsheet for loads, drills, & volumes)     Post Treatment Pain Level (on 0 to 10) scale:   0  / 10     ASSESSMENT  Assessment/Changes in Function:     Patient noted no pain following PT session. Challenged with standing without UE assistance from a standard chair. Required cues for glute activation and posterior weight shift to achieve standing position without use of UE.      []  See Progress Note/Recertification   Patient will continue to benefit from skilled PT services to modify and progress therapeutic interventions, address functional mobility deficits, address ROM deficits, address strength deficits, analyze and address soft tissue restrictions, analyze and cue movement patterns, analyze and modify body mechanics/ergonomics, address imbalance/dizziness, and instruct in home and community integration to attain remaining goals.    Progress toward goals / Updated goals:  Goals for this certification period include and are to be achieved in   4  weeks:  Patient will improve B hip abduction strength to >/= 4+/5 for improved gait, balance and transfers progressing 10/12 indicated by ability to complete exercises  Patient will demonstrate ability to perform 5x STS from standard height chair without UE support continuing 10/12 - required use of UE        PLAN  [x]  Upgrade activities as tolerated yes Continue plan of care   []  Discharge due to :    []  Other:      Therapist: Roxy Osorio    Date: 10/12/2022 Time: 1056 AM     Future Appointments   Date Time Provider Adrian Seo   10/19/2022  9:40 AM Rockne Abe [address] [facility]   10/26/2022  9:40 AM [facility] [address] [facility]   11/2/2022  9:40 AM Rockne Abe [address] [facility]

## 2022-10-19 ENCOUNTER — HOSPITAL ENCOUNTER (OUTPATIENT)
Dept: PHYSICAL THERAPY | Age: 77
Discharge: HOME | End: 2022-10-19
Payer: MEDICARE

## 2022-10-19 PROCEDURE — 97112 NEUROMUSCULAR REEDUCATION: CPT

## 2022-10-19 PROCEDURE — 97110 THERAPEUTIC EXERCISES: CPT

## 2022-10-19 NOTE — PROGRESS NOTES
PHYSICAL THERAPY - DAILY TREATMENT NOTE    Patient Name: Nirmala Obrien        Date: 10/19/2022  : 1945   yes Patient  Verified  Visit #:     Insurance: Payor: Gary Mae / Plan: [place] MEDICARE PART A & B / Product Type: Medicare /      In time: 2789 Out time: 1130   Total Treatment Time: 38     Medicare/BCBS Time Tracking (below)   Total Timed Codes (min):  38 1:1 Treatment Time:  38     TREATMENT AREA =  Spondylolisthesis, cervical region [M43.12]  Other dorsalgia [M54.89]  Left knee pain [M25.562]  Right knee pain [M25.561]    SUBJECTIVE  Pain Level (on 0 to 10 scale):  3  / 10 (R) 2/10 (L)   Medication Changes/New allergies or changes in medical history, any new surgeries or procedures?    no  If yes, update Summary List   Subjective Functional Status/Changes:  []  No changes reported     Patient reports feeling much better, but has a little aching because of the weather. Denies any falls or red flags since LV. OBJECTIVE    30 min Therapeutic Exercise:  [x]  See flow sheet   Rationale:      increase ROM and increase strength to improve the patients ability to perform ADL's and functional tasks      18 min Neuromuscular Re-Ed: [x]  See flow sheet   Rationale:    increase ROM, increase strength, improve coordination, and improve balance to improve the patients ability to perform functional transfers with ease    Billed With/As:   [x] TE   [] TA   [] Neuro   [] Self Care Patient Education: [x] Review HEP:   [employer] Access Code Date [facility] Pages   [email or web address] 10/12/22     [x] Progressed/Changed HEP based on:   [] Addressed barriers and behaviors     [] Therapeutic Neuroscience Pain Education, metaphor, reframing, contexts.     [] Sleep Education   [] Body Mechanics [] Healing Timeframe     [] Self STM with ball at \"the spot\"  [] Walking Program/Global Activity   [] other:          Other Objective/Functional Measures:    *added LAQ and knee swings to improve knee ROM and mobility  *added forward and backwards resisted walks to increase LE strength     Post Treatment Pain Level (on 0 to 10) scale:   0  / 10     ASSESSMENT  Assessment/Changes in Function:     Continued to be challenged rising from seated position without use of UE, pt noted difficulty due to L knee being \"stubborn\" therefore performed knee swings followed by LAQs to improve knee mobility. Pt demo improved ability to perform sit to stands with min UE support. []  See Progress Note/Recertification   Patient will continue to benefit from skilled PT services to modify and progress therapeutic interventions, address functional mobility deficits, address ROM deficits, address strength deficits, analyze and address soft tissue restrictions, analyze and cue movement patterns, analyze and modify body mechanics/ergonomics, address imbalance/dizziness, and instruct in home and community integration to attain remaining goals.    Progress toward goals / Updated goals:  Goals for this certification period include and are to be achieved in   4  weeks:  Patient will improve B hip abduction strength to >/= 4+/5 for improved gait, balance and transfers progressing 10/12 indicated by ability to complete exercises  Patient will demonstrate ability to perform 5x STS from standard height chair without UE support continuing 10/19 - required use of UE        PLAN  [x]  Upgrade activities as tolerated yes Continue plan of care   []  Discharge due to :    []  Other:      Therapist: Dae Pierre    Date: 10/19/2022 Time: 1246 PM     Future Appointments   Date Time Provider Adrian Seo   10/19/2022  9:40 AM [facility]   10/26/2022  9:40 AM [facility]   11/2/2022  9:40 AM [facility]

## 2022-10-25 ENCOUNTER — TELEPHONE (OUTPATIENT)
Dept: PHYSICAL THERAPY | Age: 77
End: 2022-10-25

## 2022-10-26 ENCOUNTER — APPOINTMENT (OUTPATIENT)
Dept: PHYSICAL THERAPY | Age: 77
End: 2022-10-26
Payer: MEDICARE

## 2022-10-31 ENCOUNTER — TELEPHONE (OUTPATIENT)
Dept: PHYSICAL THERAPY | Age: 77
End: 2022-10-31

## 2022-11-02 ENCOUNTER — APPOINTMENT (OUTPATIENT)
Dept: PHYSICAL THERAPY | Age: 77
End: 2022-11-02

## 2024-12-10 ENCOUNTER — TELEPHONE (OUTPATIENT)
Facility: HOSPITAL | Age: 79
End: 2024-12-10

## 2024-12-10 NOTE — TELEPHONE ENCOUNTER
Called pt to remind about upcoming eval, asked to checkin at 11:10 am to fill out ppw and to bring insurance card & ID.

## 2024-12-11 ENCOUNTER — HOSPITAL ENCOUNTER (OUTPATIENT)
Facility: HOSPITAL | Age: 79
Setting detail: RECURRING SERIES
End: 2024-12-11
Payer: MEDICARE

## 2024-12-30 ENCOUNTER — HOSPITAL ENCOUNTER (OUTPATIENT)
Facility: HOSPITAL | Age: 79
Setting detail: RECURRING SERIES
Discharge: HOME | End: 2025-01-02
Payer: MEDICARE

## 2024-12-30 PROCEDURE — 97162 PT EVAL MOD COMPLEX 30 MIN: CPT

## 2024-12-30 PROCEDURE — 95992 CANALITH REPOSITIONING PROC: CPT

## 2024-12-30 NOTE — PROGRESS NOTES
PHYSICAL / OCCUPATIONAL THERAPY - DAILY TREATMENT NOTE (updated )    Patient Name: Dara White    Date: 2024    : 1945  Insurance: Payor: MEDICARE / Plan: MEDICARE PART A AND B / Product Type: *No Product type* /      Patient  verified yes     Visit #   Current / Total 1 16   Time   In / Out 1220 100   Pain   In / Out 0 0   Subjective Functional Status/Changes: See Eval/ POC         TREATMENT AREA =  Other abnormalities of gait and mobility [R26.89]  Dizziness and giddiness [R42]    OBJECTIVE        Therapeutic Procedures:  Tx Min Billable or 1:1 Min (if diff from Tx Min) Procedure, Rationale, Specifics   10  [id number] Canalith Repositioning (un-timed):  correct positional vertigo, decrease dizziness, improve balance to improve patient's ability to progress to PLOF and address remaining functional goals.     Details if applicable:  R Eple[facility] BC Totals Reminder: bill using total billable min of TIMED therapeutic procedures (example: do not include dry needle or estim unattended, both untimed codes, in totals to left)  8-22 min = 1 unit; 23-37 min = 2 units; 38-52 min = 3 units; 53-67 min = 4 units; 68-82 min = 5 units   Total Total       Objective Information/Functional Measures/Assessment: [x]  See POC    Goals:  [x]  See POC      PLAN  yes Continue plan of care  []  Upgrade activities as tolerated  []  Discharge due to :  []  Other:    Donya Amezcua PT    2024    10:27 AM    Future Appointments   Date Time Provider Department Center   2024 12:20 PM [facility]ony[facility]       If an interpreting service was utilized for treatment of this patient, the contents of this document represent the material reviewed with the patient via the .

## 2024-12-30 NOTE — THERAPY EVALUATION
[facility]   [address], [address], [place] [address] Ph: [phone] Fx: [phone]  Plan of Care / Statement of Necessity for Physical Therapy Services     Patient Name: Dara White : 1945   Medical   Diagnosis: Other abnormalities of gait and mobility [R26.89]  Dizziness and giddiness [R42] Treatment Diagnosis: Other abnormalities of gait and mobility [R26.89]  Dizziness and giddiness [R42]   Onset Date: 2023     Referral Source: Aron Hoang MD Start of Care (SOC): 2024   Prior Hospitalization: See medical history Provider #: [id number]   Prior Level of Function: No dizziness with ADLs, ambulation without AD   Comorbidities: Arthritis, depression CHF, HTN     Assessment / key information:   Dara White is a 79 y.o.  yo female with Dx of Other abnormalities of gait and mobility [R26.89]  Dizziness and giddiness [R42].  She reports the onset of symptoms beginning in 2024, at which time she was diagnosed and hospitalized for CHF.  She has had ongoing symptoms since this time.  Dizziness is brought on by looking up, bending over, moving from sit to stand or can occur without movement.  She reports some imbalance since this same time and now carries a cane for use as needed.  Objectively, the patient demonstrates  the following:  Balance: Decreased static balance: SLS: R= 1 sec, L= 3 sec, diminished ambulatory balance assessed via Functional Gait Assessment (FGA = 13/30 points)   Dizziness with ADLs: Dizziness Handicap Inventory (DHI = 62 points).   Oculo-motor tests are WNLs except for reports of limited tolerance with saccades. And head thrusts   BPPV Assessment:   Hallpike-[place] (right):positive, Hallpike-[place] (left)negative, Roll Test (right):negative, Roll Test (left): negative.  Pt will benefit from PT/Vestibular rehab to address these deficits, improve functional independence and reduce dizziness and imbalance with

## 2025-01-03 ENCOUNTER — HOSPITAL ENCOUNTER (OUTPATIENT)
Facility: HOSPITAL | Age: 80
Setting detail: RECURRING SERIES
Discharge: HOME | End: 2025-01-06
Payer: MEDICARE

## 2025-01-03 PROCEDURE — 95992 CANALITH REPOSITIONING PROC: CPT

## 2025-01-03 PROCEDURE — 97112 NEUROMUSCULAR REEDUCATION: CPT

## 2025-01-03 NOTE — PROGRESS NOTES
PHYSICAL / OCCUPATIONAL THERAPY - DAILY TREATMENT NOTE    Patient Name: Dara White    Date: 1/3/2025    : 1945  Insurance: Payor: MEDICARE / Plan: MEDICARE PART A AND B / Product Type: *No Product type* /      Patient  verified Yes     Visit #   Current / Total 2 16   Time   In / Out 310 350p   Pain   In / Out 0 0   Subjective Functional Status/Changes: Pt states slight improvement from treatment first day but still having symptoms with position changes.     TREATMENT AREA =  Other abnormalities of gait and mobility [R26.89]  Dizziness and giddiness [R42]     OBJECTIVE        Therapeutic Procedures:    47783 Neuromuscular Re-Education (timed):  improve balance, coordination, kinesthetic sense, posture, core stability and proprioception to improve patient's ability to develop conscious control of individual muscles and awareness of position of extremities in order to progress to PLOF and address remaining functional goals.   Tx Min Billable or 1:1 Min   (if diff from Tx Min) Details:   25  See flow sheet as applicable     56845 Canalith Repositioning (un-timed):  correct positional vertigo, decrease dizziness, improve balance to improve patient's ability to progress to PLOF and address remaining functional goals.   Tx Min Billable or 1:1 Min   (if diff from Tx Min) Details:   15  See flow sheet as applicable       40  [facility] Totals Reminder: bill using total billable min of TIMED therapeutic procedures (example: do not include dry needle or estim unattended, both untimed codes, in totals to left)  8-22 min = 1 unit; 23-37 min = 2 units; 38-52 min = 3 units; 53-67 min = 4 units; 68-82 min = 5 units   Total Total     [x]  Patient Education billed concurrently with other procedures   [x] Review HEP    [] Progressed/Changed HEP, detail:    [] Other detail:       Objective Information/Functional Measures/Assessment    Performed R CRM x 2.  Reduced symptoms with 2nd maneuver, with exception of head turn

## 2025-01-06 ENCOUNTER — HOSPITAL ENCOUNTER (OUTPATIENT)
Facility: HOSPITAL | Age: 80
Setting detail: RECURRING SERIES
Discharge: HOME | End: 2025-01-09
Payer: MEDICARE

## 2025-01-06 PROCEDURE — 97112 NEUROMUSCULAR REEDUCATION: CPT

## 2025-01-06 PROCEDURE — 97530 THERAPEUTIC ACTIVITIES: CPT

## 2025-01-06 PROCEDURE — 95992 CANALITH REPOSITIONING PROC: CPT

## 2025-01-06 NOTE — PROGRESS NOTES
4:20 PM [facility]   1/31/2025  2:20 PM Vivian Alan, [facility]       If an interpreting service was utilized for treatment of this patient, the contents of this document represent the material reviewed with the patient via the .

## 2025-01-10 ENCOUNTER — APPOINTMENT (OUTPATIENT)
Facility: HOSPITAL | Age: 80
End: 2025-01-10
Payer: MEDICARE

## 2025-01-15 ENCOUNTER — HOSPITAL ENCOUNTER (OUTPATIENT)
Facility: HOSPITAL | Age: 80
Setting detail: RECURRING SERIES
Discharge: HOME | End: 2025-01-18
Payer: MEDICARE

## 2025-01-15 PROCEDURE — 97112 NEUROMUSCULAR REEDUCATION: CPT

## 2025-01-15 PROCEDURE — 97530 THERAPEUTIC ACTIVITIES: CPT

## 2025-01-15 PROCEDURE — 95992 CANALITH REPOSITIONING PROC: CPT

## 2025-01-15 NOTE — PROGRESS NOTES
PHYSICAL / OCCUPATIONAL THERAPY - DAILY TREATMENT NOTE (updated )    Patient Name: Dara White    Date: 1/15/2025    : 1945  Insurance: Payor: MEDICARE / Plan: MEDICARE PART A AND B / Product Type: *No Product type* /      Patient  verified YES   Visit #   Current / Total 4 16   Time   In / Out 421p 514 p   Pain   In / Out 0 0   Subjective Functional Status/Changes: Pt states she is having less imbalance and vertigo, still happening with quick head movement, bending forward.       TREATMENT AREA =  Other abnormalities of gait and mobility [R26.89]  Dizziness and giddiness [R42]    OBJECTIVE        Therapeutic Procedures:  Tx Min Billable or 1:1 Min (if diff from Tx Min) Procedure, Rationale, Specifics     26045 Therapeutic Exercise (timed):  increase ROM, strength, coordination, balance, and proprioception to improve patient's ability to progress to PLOF and address remaining functional goals. (see flow sheet as applicable)     Details if applicable:       25  76351 Neuromuscular Re-Education (timed):  improve balance, coordination, kinesthetic sense, posture, core stability and proprioception to improve patient's ability to develop conscious control of individual muscles and awareness of position of extremities in order to progress to PLOF and address remaining functional goals. (see flow sheet as applicable)     Details if applicable:     12  67935 Therapeutic Activity (timed):  use of dynamic activities replicating functional movements to increase ROM, strength, coordination, balance, and proprioception in order to improve patient's ability to progress to PLOF and address remaining functional goals.  (see flow sheet as applicable)     Details if applicable:       04926 Manual Therapy (timed):  decrease pain, increase ROM, increase tissue extensibility, and decrease trigger points to improve patient's ability to progress to PLOF and address remaining functional goals.  The manual therapy

## 2025-01-22 ENCOUNTER — APPOINTMENT (OUTPATIENT)
Facility: HOSPITAL | Age: 80
End: 2025-01-22
Payer: MEDICARE

## 2025-01-24 ENCOUNTER — APPOINTMENT (OUTPATIENT)
Facility: HOSPITAL | Age: 80
End: 2025-01-24
Payer: MEDICARE

## 2025-01-29 ENCOUNTER — APPOINTMENT (OUTPATIENT)
Facility: HOSPITAL | Age: 80
End: 2025-01-29
Payer: MEDICARE

## 2025-01-29 ENCOUNTER — HOSPITAL ENCOUNTER (OUTPATIENT)
Facility: HOSPITAL | Age: 80
Setting detail: RECURRING SERIES
Discharge: HOME | End: 2025-02-01
Payer: MEDICARE

## 2025-01-29 PROCEDURE — 97530 THERAPEUTIC ACTIVITIES: CPT

## 2025-01-29 PROCEDURE — 97112 NEUROMUSCULAR REEDUCATION: CPT

## 2025-01-29 PROCEDURE — 95992 CANALITH REPOSITIONING PROC: CPT

## 2025-01-29 NOTE — PROGRESS NOTES
PHYSICAL / OCCUPATIONAL THERAPY - DAILY TREATMENT NOTE (updated )    Patient Name: Dara White    Date: 2025    : 1945  Insurance: Payor: MEDICARE / Plan: MEDICARE PART A AND B / Product Type: *No Product type* /      Patient  verified YES   Visit #   Current / Total 5 16   Time   In / Out 424p 530p   Pain   In / Out 0 0   Subjective Functional Status/Changes: Pt report that she continues to experience dizziness and light headedness when looking up (especially into cabinets)  See PN       TREATMENT AREA =  Other abnormalities of gait and mobility [R26.89]  Dizziness and giddiness [R42]    OBJECTIVE  Therapeutic Procedures:  Tx Min Billable or 1:1 Min (if diff from Tx Min) Procedure, Rationale, Specifics   23  76312 Neuromuscular Re-Education (timed):  improve balance, coordination, kinesthetic sense, posture, core stability and proprioception to improve patient's ability to develop conscious control of individual muscles and awareness of position of extremities in order to progress to PLOF and address remaining functional goals. (see flow sheet as applicable)     Details if applicable:       42333 Therapeutic Activity (timed):  use of dynamic activities replicating functional movements to increase ROM, strength, coordination, balance, and proprioception in order to improve patient's ability to progress to PLOF and address remaining functional goals.  (see flow sheet as applicable)     Details if applicable:       14900 Canalith Repositioning (un-timed):  correct positional vertigo, decrease dizziness, improve balance to improve patient's ability to progress to PLOF and address remaining functional goals.     Details if applicable:  L Epley x 2   66  [facility] Totals Reminder: bill using total billable min of TIMED therapeutic procedures (example: do not include dry needle or estim unattended, both untimed codes, in totals to left)  8-22 min = 1 unit; 23-37 min = 2 units; 38-52 min = 3 units;

## 2025-01-29 NOTE — PROGRESS NOTES
[facility]   [address], [address], [place] [address] Ph: [phone] Fx: [phone]  PHYSICAL THERAPY PROGRESS NOTE  Patient Name: Dara White : 1945   Treatment/Medical Diagnosis: Other abnormalities of gait and mobility [R26.89]  Dizziness and giddiness [R42]   Referral Source: Aron Hoang MD     Date of Initial Visit: 2024 Attended Visits: 5 Missed Visits: 1     SUMMARY OF TREATMENT  Pt has been seen for 5 skilled PT sessions from 24 - 25. Skilled PT has consisted of CRM, static and dynamic balance activities, and vestibular activities. Pt has displays improvement in static balance and has made gradual progressions to advance footing. Pt continues to progress with dynamic gait activities but continues to display deviations and intermittent unsteadiness.    CURRENT STATUS    STGs  Patient will report at least 25% reduction of symptoms with ADLs.   Status at last Eval: na  Current Status: Pt continues to report increased sxs of dizziness when looking upwards. Pt reports she is able to look down for 1-2 minutes and sxs subside.  Goal Met?   Partially met, progressing     2.  Patient will be independent and complaint with HEP TID to reduce imbalance and dizziness with ADLs.   Status at last Eval: na  Current Status: Pt is independent with HEP but reports not being compliant with completing HEP daily   Goal Met?   Partially met     3. FGA will improve to greater than or equal to 15/30points to demonstrate reduction of dizziness and imbalance with ADLs.   Status at last Eval: 15/30  Current Status:   Goal Met?  yes    LTGs  1. Patient will report at least 50% reduction of symptoms with ADLs.  2. Patient will be independent with self progression of HEP and demonstrate willingness to continue HEP after D/C to maximize/maintain gains in functional mobility.  3. DHI will improve to less than or equal to 40 points to

## 2025-01-31 ENCOUNTER — HOSPITAL ENCOUNTER (OUTPATIENT)
Facility: HOSPITAL | Age: 80
Setting detail: RECURRING SERIES
End: 2025-01-31
Payer: MEDICARE

## 2025-01-31 PROCEDURE — 97112 NEUROMUSCULAR REEDUCATION: CPT

## 2025-01-31 PROCEDURE — 95992 CANALITH REPOSITIONING PROC: CPT

## 2025-01-31 PROCEDURE — 97530 THERAPEUTIC ACTIVITIES: CPT

## 2025-01-31 NOTE — PROGRESS NOTES
PHYSICAL / OCCUPATIONAL THERAPY - DAILY TREATMENT NOTE    Patient Name: Dara White    Date: 2025    : 1945  Insurance: Payor: MEDICARE / Plan: MEDICARE PART A AND B / Product Type: *No Product type* /      Patient  verified Yes     Visit #   Current / Total 6 16   Time   In / Out 2:22 p 3:06 p   Pain   In / Out 0 0   Subjective Functional Status/Changes: Pt reports that she has not had any dizziness sxs since her last visit. Pt states that she has been trying to avoid looking up to prevent sxs but has been doing it with no sxs.     TREATMENT AREA =  Other abnormalities of gait and mobility [R26.89]  Dizziness and giddiness [R42]     OBJECTIVE  Therapeutic Procedures:    86035 Neuromuscular Re-Education (timed):  improve balance, coordination, kinesthetic sense, posture, core stability and proprioception to improve patient's ability to develop conscious control of individual muscles and awareness of position of extremities in order to progress to PLOF and address remaining functional goals.   Tx Min Billable or 1:1 Min   (if diff from Tx Min) Details:   14  See flow sheet as applicable     41724 Therapeutic Activity (timed):  use of dynamic activities replicating functional movements to increase ROM, strength, coordination, balance, and proprioception in order to improve patient's ability to progress to PLOF and address remaining functional goals.   Tx Min Billable or 1:1 Min   (if diff from Tx Min) Details:   14  See flow sheet as applicable     51361 Canalith Repositioning (un-timed):  correct positional vertigo, decrease dizziness, improve balance to improve patient's ability to progress to PLOF and address remaining functional goals.   Tx Min Billable or 1:1 Min   (if diff from Tx Min) Details:   16  Negative L and R [id number]-[id number]. To include pt education regarding sxs resolving and returning to driving.         44  [facility] Totals Reminder: bill using total billable min of TIMED therapeutic

## 2025-02-03 ENCOUNTER — HOSPITAL ENCOUNTER (OUTPATIENT)
Facility: HOSPITAL | Age: 80
Setting detail: RECURRING SERIES
Discharge: HOME | End: 2025-02-06
Payer: MEDICARE

## 2025-02-03 PROCEDURE — 97530 THERAPEUTIC ACTIVITIES: CPT

## 2025-02-03 PROCEDURE — 97112 NEUROMUSCULAR REEDUCATION: CPT

## 2025-02-03 NOTE — PROGRESS NOTES
PHYSICAL / OCCUPATIONAL THERAPY - DAILY TREATMENT NOTE    Patient Name: Dara White    Date: 2/3/2025    : 1945  Insurance: Payor: MEDICARE / Plan: MEDICARE PART A AND B / Product Type: *No Product type* /      Patient  verified Yes     Visit #   Current / Total 7 16   Time   In / Out 4:30 p 5:10 p   Pain   In / Out 0 0   Subjective Functional Status/Changes: Pt reports that she fell prior to coming to session today. Pt reports that she closed the garage door before she was able to turn on the light and the darkness caught her off guard causing her to fall. Pt reports that she did not feel off balance during the fall and reports no injuries.     TREATMENT AREA =  Other abnormalities of gait and mobility [R26.89]  Dizziness and giddiness [R42]     OBJECTIVE    Therapeutic Procedures:    42259 Neuromuscular Re-Education (timed):  improve balance, coordination, kinesthetic sense, posture, core stability and proprioception to improve patient's ability to develop conscious control of individual muscles and awareness of position of extremities in order to progress to PLOF and address remaining functional goals.   Tx Min Billable or 1:1 Min   (if diff from Tx Min) Details:   25  See flow sheet as applicable     53646 Therapeutic Activity (timed):  use of dynamic activities replicating functional movements to increase ROM, strength, coordination, balance, and proprioception in order to improve patient's ability to progress to PLOF and address remaining functional goals.   Tx Min Billable or 1:1 Min   (if diff from Tx Min) Details:   15  See flow sheet as applicable       40  [facility] Totals Reminder: bill using total billable min of TIMED therapeutic procedures (example: do not include dry needle or estim unattended, both untimed codes, in totals to left)  8-22 min = 1 unit; 23-37 min = 2 units; 38-52 min = 3 units; 53-67 min = 4 units; 68-82 min = 5 units   Total Total     [x]  Patient Education billed

## 2025-02-07 ENCOUNTER — HOSPITAL ENCOUNTER (OUTPATIENT)
Facility: HOSPITAL | Age: 80
Setting detail: RECURRING SERIES
Discharge: HOME | End: 2025-02-10
Payer: MEDICARE

## 2025-02-07 PROCEDURE — 97530 THERAPEUTIC ACTIVITIES: CPT

## 2025-02-07 PROCEDURE — 97112 NEUROMUSCULAR REEDUCATION: CPT

## 2025-02-07 NOTE — PROGRESS NOTES
PHYSICAL / OCCUPATIONAL THERAPY - DAILY TREATMENT NOTE    Patient Name: Dara White    Date: 2025    : 1945  Insurance: Payor: MEDICARE / Plan: MEDICARE PART A AND B / Product Type: *No Product type* /      Patient  verified Yes     Visit #   Current / Total 8 16   Time   In / Out 302p 341p   Pain   In / Out 0 0   Subjective Functional Status/Changes: Pt reports no changes since her last skilled PT session.     TREATMENT AREA =  Other abnormalities of gait and mobility [R26.89]  Dizziness and giddiness [R42]     OBJECTIVE    Therapeutic Procedures:    69870 Neuromuscular Re-Education (timed):  improve balance, coordination, kinesthetic sense, posture, core stability and proprioception to improve patient's ability to develop conscious control of individual muscles and awareness of position of extremities in order to progress to PLOF and address remaining functional goals.   Tx Min Billable or 1:1 Min   (if diff from Tx Min) Details:   24  See flow sheet as applicable     14955 Therapeutic Activity (timed):  use of dynamic activities replicating functional movements to increase ROM, strength, coordination, balance, and proprioception in order to improve patient's ability to progress to PLOF and address remaining functional goals.   Tx Min Billable or 1:1 Min   (if diff from Tx Min) Details:   15  See flow sheet as applicable       39  [facility] Totals Reminder: bill using total billable min of TIMED therapeutic procedures (example: do not include dry needle or estim unattended, both untimed codes, in totals to left)  8-22 min = 1 unit; 23-37 min = 2 units; 38-52 min = 3 units; 53-67 min = 4 units; 68-82 min = 5 units   Total Total     [x]  Patient Education billed concurrently with other procedures   [x] Review HEP    [] Progressed/Changed HEP, detail:    [] Other detail:       Objective Information/Functional Measures/Assessment  Pt displayed good balance today during EO static balance activities.

## 2025-02-12 ENCOUNTER — APPOINTMENT (OUTPATIENT)
Facility: HOSPITAL | Age: 80
End: 2025-02-12
Payer: MEDICARE

## 2025-02-19 ENCOUNTER — APPOINTMENT (OUTPATIENT)
Facility: HOSPITAL | Age: 80
End: 2025-02-19
Payer: MEDICARE

## 2025-02-26 ENCOUNTER — APPOINTMENT (OUTPATIENT)
Facility: HOSPITAL | Age: 80
End: 2025-02-26
Payer: MEDICARE

## 2025-03-28 NOTE — THERAPY DISCHARGE
[address] [facility], [address] [facility] - Phone: [phone]  Fax: [phone] SUMMARY FOR PHYSICAL THERAPY          Patient Name: Ginger Zhang : 1945   Treatment/Medical Diagnosis: Spondylolisthesis, cervical region [M43.12]  Other dorsalgia [M54.89]  Left knee pain [M25.562]  Right knee pain [M25.561]   Onset Date: ~    Referral Source: Roxana Mercer MD [facility]): 22   Prior Hospitalization: See Medical History Provider #: [id number]   Prior Level of Function: Chronic pain with ambulation, stairs, transfers and ADL's    Comorbidities: Arthritis, Vertigo   Medications: Verified on Patient Summary List   Visits from [facility]: 9 Missed Visits: 1     Goal/Measure of Progress Goal Met? 1. Patient will be independent with HEP to improve self management of symptoms    Status at last Eval: Initiated  Current Status: Reports compliance and [place]  yes, progressing   2. Patient will report 4/10 max pain during ADL's    Status at last Eval: Pain ranges from 5-7/10 Current Status: Max pain 3/10 however reports usually 0-1/10. yes   3. Patient will improve cervical rotation to >/= 60 deg B    Status at last Eval: L rotation= 50 deg, R rotation = 55 deg. Painful and tight Current Status: L rotation = 68 deg, R rotation= 72 deg yes     4. Patient will demonstrate ability to perform 5x STS from chair without use of UE for improved functional strength    Status at last Eval: Requires use of B UE to standing with increased use of momentum required for power up  Current Status: Achieves from standard height chair with increased use of momentum and 10% PT support. Demos ability to perform independently from higher surface. no, progressing     Key Functional Changes/Progress: Pt was seen for 2 visits after last PN on 22 and then did not return following their visit on 10/19/22.  They will be DC at Received request via: Pharmacy    Was the patient seen in the last year in this department? Yes    Does the patient have an active prescription (recently filled or refills available) for medication(s) requested? No    Pharmacy Name: [id number] - [place], [place] - [address] AT [facility] [place] [address]     Does the patient have residential Plus and need 100-day supply? (This applies to ALL medications) Patient does not have SCP   this time due to not returning. Below are comments and goals from last PN. Pt has made decent progress in B knee and cervical pain / function since beginning PT. Pt has reduced pain levels from 5-7/10 to 0-3/10 and has not noticed any pain in her neck in 2 weeks. Pt reports ease with walking, increase in stability / balance, improved strength and less pain. Pt self reports ~90% improvement since staring PT. Pt remains challenged with transitioning from sit to stand position. Current Objective Findings:   Cervical ROM: Flexion: WNL, Ext:  WNL, Rot L= 68 deg, R= 72 deg  Knee ROM: 0-138 deg, denies pain   Strength MMT: Knee= Flex: 5-/5 R, 5-/5 L. Ext: 5-/5 B. . Hip Abduction= 4-/5 L, 4/5 R  Functional Strength: STS: achieves from standard height chair with increased use of momentum and 10% PT support , No quad lag noted during SLR    Assessments/Recommendations: DC at this time due to not returning. If you have any questions/comments please contact us directly at [phone]. Thank you for allowing us to assist in the care of your patient. Therapist Signature: Marisol Guallpa PT Date: 56/42/6413   Certification Period:  Reporting Period: 8/22/22-11/22/22 9/26/22 - 10/19/22 Time: 8:19 AM   NOTE TO PHYSICIAN:  PLEASE COMPLETE THE ORDERS BELOW AND FAX TO   [facility] Physical Therapy: [phone]  If you are unable to process this request in 24 hours please contact our office: [phone]    ___ I have read the above report and request that my patient continue as recommended.   ___ I have read the above report and request that my patient continue therapy with the following changes/special instructions: ________________________________________________   ___ I have read the above report and request that my patient be discharged from therapy.      Physician Signature:        Date:       Time:                                       Beata Kelly MD